# Patient Record
Sex: MALE | Race: BLACK OR AFRICAN AMERICAN | URBAN - METROPOLITAN AREA
[De-identification: names, ages, dates, MRNs, and addresses within clinical notes are randomized per-mention and may not be internally consistent; named-entity substitution may affect disease eponyms.]

---

## 2021-03-12 ENCOUNTER — OFFICE VISIT (OUTPATIENT)
Dept: INTERNAL MEDICINE | Facility: CLINIC | Age: 26
End: 2021-03-12
Payer: COMMERCIAL

## 2021-03-12 VITALS
OXYGEN SATURATION: 97 % | TEMPERATURE: 98.4 F | BODY MASS INDEX: 27.4 KG/M2 | HEART RATE: 92 BPM | WEIGHT: 185 LBS | RESPIRATION RATE: 14 BRPM | SYSTOLIC BLOOD PRESSURE: 114 MMHG | DIASTOLIC BLOOD PRESSURE: 68 MMHG | HEIGHT: 69 IN

## 2021-03-12 DIAGNOSIS — N47.8 FORESKIN DOES NOT RETRACT: Primary | ICD-10-CM

## 2021-03-12 DIAGNOSIS — N47.1 TIGHT FORESKIN: ICD-10-CM

## 2021-03-12 PROCEDURE — 99203 OFFICE O/P NEW LOW 30 MIN: CPT | Performed by: INTERNAL MEDICINE

## 2021-03-12 ASSESSMENT — MIFFLIN-ST. JEOR: SCORE: 1814.53

## 2021-03-12 ASSESSMENT — PAIN SCALES - GENERAL: PAINLEVEL: NO PAIN (0)

## 2021-03-12 NOTE — PATIENT INSTRUCTIONS
[facility]   [address]   [address] [place] [id number]   Phone: [phone]   Fax: [phone]

## 2021-03-12 NOTE — PROGRESS NOTES
"    Assessment & Plan   Problem List Items Addressed This Visit     None      Visit Diagnoses     Foreskin does not retract    -  Primary    Relevant Orders    UROLOGY ADULT REFERRAL    Tight foreskin        Relevant Orders    UROLOGY ADULT REFERRAL        Urology referral placed for discussing about possible circumcision.    No follow-ups on file.    Florentin Whalen MD  [facility] REGINALD Villa is a 25 year old who presents for the following health issues : discuss personal problem/situation.    HPI   Patient has difficulty retracting the foreskin completely and causing discomfort every time he is retracting.  Had problem when he was young and has seen specialist but never had any surgery.  Denies circumcision in the past.  Urinary symptoms.  Denies genital sores.  Sexually active.  Review of Systems   Genitourinary: Negative for discharge, penile pain and penile swelling.          Objective    /68   Pulse 92   Temp 98.4  F (36.9  C) (Oral)   Resp 14   Ht 1.753 m (5' 9\")   Wt 83.9 kg (185 lb)   SpO2 97%   BMI 27.32 kg/m    Body mass index is 27.32 kg/m .  Physical Exam  Genitourinary:     Penis: Normal.       Comments: Foreskin was tight and was difficult to retract.               "

## 2021-03-15 NOTE — TELEPHONE ENCOUNTER
MEDICAL RECORDS REQUEST   [facility]  [address] [place] [address]  PHONE: [phone]  Fax: [phone]        FUTURE VISIT INFORMATION                                                   Allen Baez, : 1995 scheduled for future visit at [facility]    APPOINTMENT INFORMATION:    Date: 2021    Provider:  Ovidio BOLNAOS    Reason for Visit/Diagnosis: Foreskin issues    REFERRAL INFORMATION:    Referring provider:  N/A    Specialty: N/A    Referring providers clinic:      Clinic contact number:  N/A    RECORDS REQUESTED FOR VISIT                                                     NOTES  STATUS/DETAILS   OFFICE NOTE from referring provider  yes   OFFICE NOTE from other specialist  no   DISCHARGE SUMMARY from hospital  no   DISCHARGE REPORT from the ER  no   OPERATIVE REPORT  no   MEDICATION LIST  no     PRE-VISIT CHECKLIST      Record collection complete Yes   Appointment appropriately scheduled           (right time/right provider) Yes   MyChart activation If no, please explain: In process   Questionnaire complete If no, please explain: In process      Completed by: Fatemeh Castillo

## 2021-04-13 ENCOUNTER — PRE VISIT (OUTPATIENT)
Dept: UROLOGY | Facility: CLINIC | Age: 26
End: 2021-04-13

## 2021-04-13 NOTE — TELEPHONE ENCOUNTER
Reason for visit: Tight foreskin Consult     Relevant information: discus possible circumcision    Records/imaging/labs/orders: in EPIC    Pt called: no    At Rooming: normal

## 2021-04-29 ENCOUNTER — PRE VISIT (OUTPATIENT)
Dept: UROLOGY | Facility: CLINIC | Age: 26
End: 2021-04-29